# Patient Record
Sex: FEMALE
[De-identification: names, ages, dates, MRNs, and addresses within clinical notes are randomized per-mention and may not be internally consistent; named-entity substitution may affect disease eponyms.]

---

## 2020-06-25 ENCOUNTER — TRANSCRIPTION ENCOUNTER (OUTPATIENT)
Age: 75
End: 2020-06-25

## 2023-10-05 ENCOUNTER — OUTPATIENT (OUTPATIENT)
Dept: OUTPATIENT SERVICES | Facility: HOSPITAL | Age: 78
LOS: 1 days | End: 2023-10-05
Payer: MEDICARE

## 2023-10-05 ENCOUNTER — RESULT REVIEW (OUTPATIENT)
Age: 78
End: 2023-10-05

## 2023-10-05 ENCOUNTER — APPOINTMENT (OUTPATIENT)
Dept: ORTHOPEDIC SURGERY | Facility: CLINIC | Age: 78
End: 2023-10-05
Payer: MEDICARE

## 2023-10-05 VITALS
HEART RATE: 82 BPM | HEIGHT: 62 IN | DIASTOLIC BLOOD PRESSURE: 77 MMHG | SYSTOLIC BLOOD PRESSURE: 112 MMHG | BODY MASS INDEX: 24.84 KG/M2 | WEIGHT: 135 LBS | OXYGEN SATURATION: 98 %

## 2023-10-05 DIAGNOSIS — S22.009A UNSPECIFIED FRACTURE OF UNSPECIFIED THORACIC VERTEBRA, INITIAL ENCOUNTER FOR CLOSED FRACTURE: ICD-10-CM

## 2023-10-05 DIAGNOSIS — M47.812 SPONDYLOSIS W/OUT MYELOPATHY OR RADICULOPATHY, CERVICAL REGION: ICD-10-CM

## 2023-10-05 DIAGNOSIS — M41.9 SCOLIOSIS, UNSPECIFIED: ICD-10-CM

## 2023-10-05 DIAGNOSIS — Z78.9 OTHER SPECIFIED HEALTH STATUS: ICD-10-CM

## 2023-10-05 DIAGNOSIS — M43.10 SPONDYLOLISTHESIS, SITE UNSPECIFIED: ICD-10-CM

## 2023-10-05 PROBLEM — Z00.00 ENCOUNTER FOR PREVENTIVE HEALTH EXAMINATION: Status: ACTIVE | Noted: 2023-10-05

## 2023-10-05 PROCEDURE — 72082 X-RAY EXAM ENTIRE SPI 2/3 VW: CPT

## 2023-10-05 PROCEDURE — 99205 OFFICE O/P NEW HI 60 MIN: CPT

## 2023-10-05 PROCEDURE — 72084 X-RAY EXAM ENTIRE SPI 6/> VW: CPT | Mod: 26

## 2023-10-05 PROCEDURE — 72040 X-RAY EXAM NECK SPINE 2-3 VW: CPT

## 2023-10-05 PROCEDURE — 72084 X-RAY EXAM ENTIRE SPI 6/> VW: CPT

## 2023-10-05 RX ORDER — ESOMEPRAZOLE MAGNESIUM 40 MG/1
CAPSULE, DELAYED RELEASE ORAL
Refills: 0 | Status: ACTIVE | COMMUNITY

## 2023-10-30 PROBLEM — M43.10 SPONDYLOLISTHESIS: Status: ACTIVE | Noted: 2023-10-30

## 2023-10-30 PROBLEM — M41.9 SCOLIOSIS: Status: ACTIVE | Noted: 2023-10-30

## 2023-10-30 PROBLEM — M47.812 CERVICAL SPONDYLOSIS: Status: ACTIVE | Noted: 2023-10-30

## 2023-10-30 PROBLEM — M47.812 CERVICAL SPONDYLOSIS: Status: ACTIVE | Noted: 2023-10-05

## 2023-10-30 PROBLEM — S22.009A CLOSED FRACTURE OF THORACIC VERTEBRA: Status: ACTIVE | Noted: 2023-10-30

## 2024-01-23 NOTE — DISCUSSION/SUMMARY
[de-identified] : A lengthy discussion was had with the patient regarding her condition.   Cervicothroacic imaging with stable spondylolisthesis, no cord compression.  Will continue to observe as asymptomatic.   F/u as needed.  The patient's questions were sought and answered satisfactorily.   IAna Paula NP am acting as a scribe for Dr. Frank Schwab.

## 2024-01-23 NOTE — HISTORY OF PRESENT ILLNESS
[de-identified] : Pt presents with complaints of loose spinal instrumentation at T4.   Had thoracolumbar spinal fusion in 9/2009 with Robin Rodriguez and Schwab.  Has no significant back pain, occasionally tight.    Has limited cervical spine ROM.  Had CT due to "lump" in neck, + parotid mass.   Has sx scheduled for 11/2.  Is concerned with CT findings.   Denies radicular pain or problems with balance, fine motor skills.  Takes tylenol prn.

## 2024-01-23 NOTE — PHYSICAL EXAM
[de-identified] : Scoliosis xray with cervical dynamic views 10/5/2023:  Instrumentation intact, no halos about the screws, C7-T1 spondylolisthesis, T3-4 spondylolisthesis, stable.  CT soft tissue neck 9/13/2023:  C5-6, C6-7 severe R foraminal stenosis [de-identified] : NVI.   No discomfort with lumbar range of motion.  Stands with erect posture.  No point tenderness.